# Patient Record
Sex: MALE | URBAN - METROPOLITAN AREA
[De-identification: names, ages, dates, MRNs, and addresses within clinical notes are randomized per-mention and may not be internally consistent; named-entity substitution may affect disease eponyms.]

---

## 2021-07-23 ENCOUNTER — EMERGENCY (EMERGENCY)
Facility: HOSPITAL | Age: 46
LOS: 1 days | Discharge: HOME | End: 2021-07-23
Attending: EMERGENCY MEDICINE
Payer: MEDICAID

## 2021-07-23 VITALS
TEMPERATURE: 98 F | RESPIRATION RATE: 18 BRPM | OXYGEN SATURATION: 96 % | HEIGHT: 71 IN | DIASTOLIC BLOOD PRESSURE: 79 MMHG | SYSTOLIC BLOOD PRESSURE: 134 MMHG | HEART RATE: 100 BPM | WEIGHT: 199.96 LBS

## 2021-07-23 PROCEDURE — 99284 EMERGENCY DEPT VISIT MOD MDM: CPT

## 2021-07-23 PROCEDURE — 99285 EMERGENCY DEPT VISIT HI MDM: CPT

## 2021-07-23 PROCEDURE — 82962 GLUCOSE BLOOD TEST: CPT

## 2021-07-23 NOTE — ED PROVIDER NOTE - OBJECTIVE STATEMENT
46 y.o male with Non-Hockin lymphoma and seizures presents to the ED by the EMS for alcohol intoxication. Patient was about to get into a concert but was too altered and drunk to be allowed in. EMS was called to bring him in. Patient is asymptomatic. Patient denies trauma or any other complaints. Allergy to Aspirin.

## 2021-07-23 NOTE — ED PROVIDER NOTE - CLINICAL SUMMARY MEDICAL DECISION MAKING FREE TEXT BOX
46 y.o male with an uncomplicated alcohol intoxication. Will observe to clinical sobriety then likely discharge.

## 2021-07-23 NOTE — ED PROVIDER NOTE - NSFOLLOWUPCLINICS_GEN_ALL_ED_FT
[facility]  Internal Medicine  [address] [place] [address]  Phone: [phone]  Fax: [phone]

## 2021-07-23 NOTE — ED PROVIDER NOTE - PROGRESS NOTE DETAILS
ambulated to the bathroom w normal gait, normal speech, AAOx4, wants to go home, will take the train. Ate dinner. Will DC

## 2021-07-23 NOTE — ED PROVIDER NOTE - NSFOLLOWUPINSTRUCTIONS_ED_ALL_ED_FT
Stop drinking alcohol.  Take Tylenol as needed for pains/fevers.  Follow up with your doctor or in the Clinic as needed.  Return to the ER for any concerns.

## 2023-11-09 NOTE — ED PROVIDER NOTE - CRANIAL NERVE AND PUPILLARY EXAM
cranial nerves 2-12 intact/central and peripheral vision intact/gag reflex intact/tongue is midline adult consistency food

## 2024-04-18 PROBLEM — Z00.00 ENCOUNTER FOR PREVENTIVE HEALTH EXAMINATION: Status: ACTIVE | Noted: 2024-04-18
